# Patient Record
Sex: FEMALE | Race: WHITE
[De-identification: names, ages, dates, MRNs, and addresses within clinical notes are randomized per-mention and may not be internally consistent; named-entity substitution may affect disease eponyms.]

---

## 2017-12-31 ENCOUNTER — HOSPITAL ENCOUNTER (INPATIENT)
Dept: HOSPITAL 80 - FED | Age: 77
LOS: 4 days | Discharge: NURSING, REHAB OR LONG-TERM CARE | DRG: 956 | End: 2018-01-04
Attending: FAMILY MEDICINE | Admitting: FAMILY MEDICINE
Payer: COMMERCIAL

## 2017-12-31 DIAGNOSIS — I25.10: ICD-10-CM

## 2017-12-31 DIAGNOSIS — S72.002A: Primary | ICD-10-CM

## 2017-12-31 DIAGNOSIS — I25.2: ICD-10-CM

## 2017-12-31 DIAGNOSIS — G92: ICD-10-CM

## 2017-12-31 DIAGNOSIS — S43.012A: ICD-10-CM

## 2017-12-31 DIAGNOSIS — S32.512A: ICD-10-CM

## 2017-12-31 DIAGNOSIS — T40.605A: ICD-10-CM
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
## 2017-12-31 DIAGNOSIS — S32.10XA: ICD-10-CM

## 2017-12-31 DIAGNOSIS — M48.061: ICD-10-CM

## 2017-12-31 DIAGNOSIS — I10: ICD-10-CM

## 2017-12-31 DIAGNOSIS — F32.9: ICD-10-CM

## 2017-12-31 DIAGNOSIS — Z90.5: ICD-10-CM

## 2017-12-31 DIAGNOSIS — Z95.5: ICD-10-CM

## 2017-12-31 LAB
INR PPP: 0.86 (ref 0.83–1.16)
PLATELET # BLD: 275 10^3/UL (ref 150–400)
PROTHROMBIN TIME: 11.9 SEC (ref 12–15)

## 2017-12-31 PROCEDURE — 0RSKXZZ REPOSITION LEFT SHOULDER JOINT, EXTERNAL APPROACH: ICD-10-PCS | Performed by: PHYSICIAN ASSISTANT

## 2017-12-31 RX ADMIN — ACETAMINOPHEN PRN MG: 325 TABLET ORAL at 22:06

## 2017-12-31 RX ADMIN — OXYCODONE HYDROCHLORIDE PRN MG: 15 TABLET ORAL at 22:06

## 2017-12-31 RX ADMIN — HYDROMORPHONE HYDROCHLORIDE PRN MG: 2 INJECTION INTRAMUSCULAR; INTRAVENOUS; SUBCUTANEOUS at 22:05

## 2017-12-31 NOTE — EDPHY
H & P


Time Seen by Provider: 12/31/17 19:17


HPI/ROS: 





CHIEF COMPLAINT:  Fall, left shoulder and left hip injury





HISTORY OF PRESENT ILLNESS:  77-year-old female presents to the emergency 

department by ambulance after she had a mechanical fall at the movie theater.  

The patient states that she tripped and fell onto her left side.  She did not 

hit her head or lose consciousness.  She denies any presyncopal symptoms prior 

to her fall.  She complains of severe pain in her left shoulder as well as pain 

in her left hip.  She denies neck or back pain.  Denies abdominal pain.  Denies 

injuries to her right upper or lower extremities.





REVIEW OF SYSTEMS:


Constitutional:  No fever, no chills.


Eyes:  No double or blurry vision.


ENT:  No sore throat.


Respiratory:  No cough, no shortness of breath.


Cardiac:  No chest pain.


Gastrointestinal:  No abdominal pain, vomiting or diarrhea.


Genitourinary:  No dysuria.


Musculoskeletal:  No neck or back pain.


Skin:  No rashes.


Neurological:  No headache. (Katlyn Bernstein)


Past Medical/Surgical History: 





Hypertension, myocardial infarction with stent, kidney donor, back pain (Katlyn Bernstein)


Social History: 





Visiting from [place] (Katlyn Bernstein)


Physical Exam: 





General Appearance:  Alert, no distress. No physical signs of trauma to her 

head.  She is mentating normally and answering questions appropriately.


Eyes:  Pupils equal and round.  Extraocular motions are all intact.


ENT:  Mouth:  Mucous membranes moist.


Respiratory:  No wheezing, rhonchi, or rales, lungs are clear to auscultation.


Cardiovascular:  Regular rate and rhythm.


Gastrointestinal:  Abdomen is soft and nontender, no masses, no rebound or 

guarding, bowel sounds normal.


Neurological:  Alert and oriented x 3, cranial nerves II through XII grossly 

intact


Skin:  Warm and dry, no rashes.


Musculoskeletal:  Nontender to palpate along the cervical, thoracic or lumbar 

spine.  Neck is supple.


Extremities:  Patient is holding her left shoulder in external rotation.  

Obvious palpable deformity noted.  Nontender to palpate in her left wrist or 

elbow.  Pain with palpation to the lateral aspect of her left hip.  Pain with 

external rotation of the left hip.  Nontender to palpate the left knee or left 

ankle.  Nontender to palpate in her right upper or lower extremity.


Psychiatric:  Patient is oriented X 3, there is no agitation. (Katlyn Bernstein)


Constitutional: 


 Initial Vital Signs











Temperature (C)  36.3 C   12/31/17 19:13


 


Heart Rate  67   12/31/17 19:13


 


Respiratory Rate  18   12/31/17 19:13


 


Blood Pressure  209/105 H  12/31/17 19:13


 


O2 Sat (%)  92   12/31/17 19:13








 











O2 Delivery Mode [Post         Nasal Cannula





Procedure 3rd]                 


 


O2 Delivery Mode [Post         Non-Rebreather Mask





Procedure 2nd]                 


 


O2 Delivery Mode [Post         Non-Rebreather Mask





Procedure 1st]                 


 


O2 Delivery Mode [Procedural   Non-Rebreather Mask





1st]                           


 


O2 Delivery Mode               Non-Rebreather Mask


 


O2 (L/minute) [Post Procedure  5





3rd]                           


 


O2 (L/minute) [Post Procedure  10





2nd]                           


 


O2 (L/minute)                  15














Allergies/Adverse Reactions: 


 





NSAIDS (Non-Steroidal Anti-Inflamma Allergy (Verified 12/31/17 19:11)


 








Home Medications: 














 Medication  Instructions  Recorded


 


Beclomethasone Dipropionate [Qnasl] 2 sprays EACHNARE DAILY PRN 12/31/17


 


Clotrimazole 1% [Lotrimin 1%] 1 roberto carlos TP DAILY PRN 12/31/17


 


Co-Codamol 8mg/500mg 1 - 2 tab PO QID PRN 12/31/17


 


FLUoxetine [Prozac 20 MG (*)] 20 mg PO DAILY 12/31/17


 


Ramipril [Altace 1.25mg (*)] 1.25 mg PO HS 12/31/17














Medical Decision Making





- Diagnostics


Imaging: Discussed imaging studies w/ On call Radiologist, I viewed and 

interpreted images myself


Procedures: 


Procedure:  Dislocation reduction.





The dislocation of the the left shoulder was reduced using a gentle traction 

and counter traction technique without complications.  Post reduction the 

patient's neurovascular exam is normal.  Post reduction x-ray demonstrates 

reduction of the joint to the anatomic position.


The procedure was performed by myself.





The patient was placed in a sling and examined post application in good 

placement with normal CNS. (Katlyn Bernstein)


ED Course/Re-evaluation: 


77-year-old female presents after mechanical fall injuring her left shoulder 

and left hip.  X-rays reveal left femoral neck fracture as well as possible 

sacral insufficiency fracture.  There is subacute fracture to the left inferior 

pubic rami as well.





X-rays of the left shoulder reveal anterior inferior dislocation.





Procedural sedation performed by Dr. Jill Celeste, see procedure note.





Patient was placed in a sling and examined post application in good placement 

with normal CNS.  Patient will be admitted to Dr. Som Winslow, hospitalist.





X-rays of the left hip reveal femoral neck fracture.  The case was discussed 

with the on-call orthopedic surgeon, Dr. Juares, who will come to evaluate the 

patient.  The patient will be admitted to Dr. Som Winslow. (Katlyn Bernstein)





I have evaluated and participated in the management of this patient.  My co-

signature indicates that I have reviewed this chart and that I agree with the 

findings and the plan of care as documented.  My personal history and physical 

findings include: The patient is a 78 y/o female who had a mechanical fall in a 

movie theater tonight. Denies striking her head or loss of consciousness. Lungs 

clear. Heart regular.  Abdomen is obese, soft and nontender. Left leg shortened 

and externally rotated. Left arm is held up above her head and flexed at the 

elbow.  Deltoid sensation is intact bilaterally in the upper extremities.  2+ 

radial pulse on the left.  She is awake and alert.  GCS 15.  Head is 

normocephalic.  No tenderness with palpation of the neck and in the midline. No 

pain with active range of motion of her neck.  JAXON.  EOMI.  Tongue midline.  

Facial expressions symmetric.





2000: Procedure: Conscious sedation.





Indication: Left shoulder reduction


I was asked by MATTHIAS Bauer to perform procedural sedation.  The patient 

is an appropriate candidate to tolerate procedural sedation.  The patient's 

vitals signs and mental status are appropriate.  The risks, benefits and 

alternatives of the sedation were discussed with the patient.   The patient is 

ASA classification 3.  The patient's Mallampati airway score was 4 and the 

patient did not meet the 3-3-2 airway measurements.  A time out was completed.  

The patient was sedated with 85mg IV Propofol. The patient was monitored with 

continuous pulse oximetry, EKG monitor and end tidal CO2.  There were no 

complications. Patient had mild episode of apnea.  I performed the sedation.  

The total time I spent at the bedside during the procedural sedation was 15 

minutes.  The patient was examined after the procedural sedation and has 

returned to their pre-sedation baseline with normal vital signs and a normal 

examination.


 (Jill Celeste)


Differential Diagnosis: 





Including but limited to fracture, dislocation, contusion, sprain (Katlyn Bernstein)





- Data Points


Laboratory Results: 


 Laboratory Results





 12/31/17 19:10 





 12/31/17 19:10 








Medications Given: 


 





Acetaminophen (Tylenol)  650 mg PO Q6 PRN


   PRN Reason: Pain, Mild/Fever, Can Take PO


   Stop: 06/29/18 21:20


   Last Admin: 01/01/18 04:00 Dose:  650 mg


Enoxaparin Sodium (Lovenox)  40 mg SC DAILY [facility]


   Stop: 06/30/18 08:59


   Last Admin: 01/01/18 07:26 Dose:  Not Given


Fluoxetine HCl (Prozac)  20 mg PO DAILY [facility]


   Stop: 06/30/18 08:59


   Last Admin: 01/01/18 07:28 Dose:  20 mg


Hydromorphone HCl (Dilaudid)  0.25 - 0.5 mg IVP Q4 PRN


   PRN Reason: Pain, Severe Unable to Take PO


   Stop: 01/10/18 21:20


   Last Admin: 12/31/17 22:05 Dose:  0.5 mg


Oxycodone HCl (Oxycodone Ir)  5 - 10 mg PO Q4 PRN


   PRN Reason: Pain, Severe Able to Take PO


   Stop: 01/10/18 21:20


   Last Admin: 01/01/18 07:28 Dose:  10 mg





Discontinued Medications





Bacitracin (Bacitracin Syringe) Confirm Administered Dose 50,000 units IRR .STK-

MED ONE


   Stop: 01/01/18 11:11


   Last Admin: 01/01/18 11:22 Dose:  50,000 units


Bupivacaine HCl (Sensorcaine 0.5% Vial) Confirm Administered Dose 30 ml .ROUTE 

.STK-MED ONE


   Stop: 01/01/18 08:51


   Last Admin: 01/01/18 11:22 Dose:  30 ml


Diazepam (Valium Injection)  2.5 mg IVP EDNOW ONE


   Stop: 12/31/17 20:21


   Last Admin: 12/31/17 20:21 Dose:  2.5 mg


Epinephrine HCl (Epinephrine) Confirm Administered Dose 1 mg .ROUTE .STK-MED ONE


   Stop: 01/01/18 08:51


   Last Admin: 01/01/18 11:23 Dose:  1 mg


Fentanyl (Sublimaze)  100 mcg IVP EDNOW ONE


   Stop: 12/31/17 19:39


   Last Admin: 12/31/17 19:40 Dose:  100 mcg


Fentanyl (Sublimaze)  50 mcg IVP EDNOW ONE


   Stop: 12/31/17 21:02


   Last Admin: 12/31/17 21:05 Dose:  50 mcg


Sodium Chloride (Ns)  1,000 mls @ 0 mls/hr IV EDNOW ONE; Wide Open


   PRN Reason: Protocol


   Stop: 12/31/17 19:47


   Last Admin: 12/31/17 19:52 Dose:  1,000 mls


Cefazolin Sodium (Cefazolin Syringe)  2 gm in 20 mls @ 200 mls/hr IVP ONCALL ONE


   PRN Reason: Protocol


   Stop: 01/01/18 09:35


   Last Admin: 01/01/18 10:33 Dose:  20 mls


Propofol (Diprivan)  100 mg IVP EDNOW ONE


   Stop: 12/31/17 19:57


   Last Admin: 12/31/17 20:01 Dose:  85 mg








Departure





- Departure


Disposition: [facility]


Clinical Impression: 


Closed left hip fracture


Qualifiers:


 Encounter type: initial encounter Qualified Code(s): S72.002A - Fracture of 

unspecified part of neck of left femur, initial encounter for closed fracture





Dislocation of left shoulder joint


Qualifiers:


 Encounter type: initial encounter Qualified Code(s): S43.005A - Unspecified 

dislocation of left shoulder joint, initial encounter





Condition: Fair

## 2017-12-31 NOTE — PDGENHP
History and Physical





- Chief Complaint


Shoulder and hip pain





- History of Present Illness


This 77-year-old female visiting from [place] with history of coronary artery 

disease with stents as well as a solitary kidney who was at the movies at this 

evening and took a mechanical fall.  Patient had a good the bathroom.  She 

stood up and tripped upon exiting her I will.  She landed on her left side.  

She had immediate pain in her left shoulder and hip and was unable to get up.  

The there is no head trauma or loss of consciousness.  Prior to the fall today 

she has been in her usual state of health.





History Information





- Allergies/Home Medication List


Allergies/Adverse Reactions: 








NSAIDS (Non-Steroidal Anti-Inflamma Allergy (Verified 12/31/17 19:11)


 





Home Medications: 








Beclomethasone Dipropionate [Qnasl] 2 sprays EACHNARE DAILY PRN 12/31/17 [Last 

Taken Unknown]


Clotrimazole 1% [Lotrimin 1%] 1 roberto carlos TP DAILY PRN 12/31/17 [Last Taken 12/31/17]


Co-Codamol 8mg/500mg 1 - 2 tab PO QID PRN 12/31/17 [Last Taken 12/31/17 10:00 1 

tab]


FLUoxetine [Prozac 20 MG (*)] 20 mg PO DAILY 12/31/17 [Last Taken 12/30/17]


Ramipril [Altace 1.25mg (*)] 1.25 mg PO HS 12/31/17 [Last Taken 12/30/17]





I have personally reviewed and updated: family history, medical history, social 

history, surgical history





- Past Medical History


coronary artery disease (2 stents placed in 2007)


Additional medical history: Solitary kidney status post renal donation, 

hypertension, osteoarthritis, lumbar stenosis





- Surgical History


Additional surgical history: Kidney donation, stent placement





- Social History


Smoking Status: Never smoked


Alcohol Use: None


Drug Use: None


Additional social history: She is visiting from [place] in is scheduled to 

return to the [place] on January 16th





Review of Systems


Review of Systems: 





ROS: 10pt was reviewed & negative except for what was stated in HPI & below





Physical Exam


Physical Exam: 

















Temp Pulse Resp BP Pulse Ox


 


 36.3 C   65   8 L  220/107 H  91 L


 


 12/31/17 19:13  12/31/17 19:58  12/31/17 19:58  12/31/17 19:58  12/31/17 19:58











Constitutional: uncomfortable


Eyes: PERRL, anicteric sclera, EOMI


Ears, Nose, Mouth, Throat: moist mucous membranes, hearing normal, ears appear 

normal, no oral mucosal ulcers


Cardiovascular: regular rate and rhythym, no murmur, rub, or gallop, No edema


Peripheral Pulses: 2+: dorsalis-pedis (R), dorsalis-pedis (L)


Respiratory: no respiratory distress, no rales or rhonchi, clear to auscultation


Gastrointestinal: normoactive bowel sounds, soft, non-tender abdomen, no 

palpable masses


Genitourinary: no bladder fullness, no bladder tenderness


Skin: warm, normal color, no rashes or abrasions, no fluctuance, no induration, 

No mottled


Musculoskeletal: normal joint ROM, no joint effusions, other (Left lower 

extremity is held in external rotation and is painful with any kind of movement)


Neurologic: AAOx3, CN II-XII Intact, No facial droop


Psychiatric: interacting appropriately, not anxious, not encephalopathic, 

thought process linear


Lymph, Heme, Immunologic: no cervical LAD, no supraclavicular LAD





Lab Data & Imaging Review





 12/31/17 19:10





 12/31/17 19:10














WBC  10.46 10^3/uL (3.80-9.50)  H  12/31/17  19:10    


 


RBC  4.53 10^6/uL (4.18-5.33)   12/31/17  19:10    


 


Hgb  14.5 g/dL (12.6-16.3)   12/31/17  19:10    


 


Hct  41.5 % (38.0-47.0)   12/31/17  19:10    


 


MCV  91.6 fL (81.5-99.8)   12/31/17  19:10    


 


MCH  32.0 pg (27.9-34.1)   12/31/17  19:10    


 


MCHC  34.9 g/dL (32.4-36.7)   12/31/17  19:10    


 


RDW  12.3 % (11.5-15.2)   12/31/17  19:10    


 


Plt Count  275 10^3/uL (150-400)   12/31/17  19:10    


 


MPV  10.0 fL (8.7-11.7)   12/31/17  19:10    


 


Neut % (Auto)  45.3 % (39.3-74.2)   12/31/17  19:10    


 


Lymph % (Auto)  42.1 % (15.0-45.0)   12/31/17  19:10    


 


Mono % (Auto)  7.2 % (4.5-13.0)   12/31/17  19:10    


 


Eos % (Auto)  3.9 % (0.6-7.6)   12/31/17  19:10    


 


Baso % (Auto)  0.9 % (0.3-1.7)   12/31/17  19:10    


 


Nucleat RBC Rel Count  0.0 % (0.0-0.2)   12/31/17  19:10    


 


Absolute Neuts (auto)  4.75 10^3/uL (1.70-6.50)   12/31/17  19:10    


 


Absolute Lymphs (auto)  4.40 10^3/uL (1.00-3.00)  H  12/31/17  19:10    


 


Absolute Monos (auto)  0.75 10^3/uL (0.30-0.80)   12/31/17  19:10    


 


Absolute Eos (auto)  0.41 10^3/uL (0.03-0.40)  H  12/31/17  19:10    


 


Absolute Basos (auto)  0.09 10^3/uL (0.02-0.10)   12/31/17  19:10    


 


Absolute Nucleated RBC  0.00 10^3/uL (0-0.01)   12/31/17  19:10    


 


Immature Gran %  0.6 % (0.0-1.1)   12/31/17  19:10    


 


Immature Gran #  0.06 10^3/uL (0.00-0.10)   12/31/17  19:10    


 


Sodium  139 mEq/L (134-144)   12/31/17  19:10    


 


Potassium  3.5 mEq/L (3.5-5.2)   12/31/17  19:10    


 


Chloride  100 mEq/L ()   12/31/17  19:10    


 


Carbon Dioxide  24 mEq/l (22-31)   12/31/17  19:10    


 


Anion Gap  15 mEq/L (8-16)   12/31/17  19:10    


 


BUN  13 mg/dL (7-23)   12/31/17  19:10    


 


Creatinine  0.8 mg/dL (0.6-1.0)   12/31/17  19:10    


 


Estimated GFR  > 60   12/31/17  19:10    


 


Glucose  126 mg/dL ()  H  12/31/17  19:10    


 


Calcium  9.7 mg/dL (8.5-10.4)   12/31/17  19:10    








Visualized and Interpreted imaging results: Yes


Interpretation: Left femoral neck fracture with diffuse bone demineralization 

and sacral insufficiency fractures; shoulder x-ray revealed left anterior 

shoulder dislocation status post reduction in the emergency department





Assessment & Plan


Assessment: 





This 77-year-old female visiting from [place] presenting status post mechanical 

fall sustaining:





# left femoral neck fracture likely pathologic in the setting of osteoporosis


# left shoulder anterior dislocation status post reduction in the emergency 

department


# history coronary artery disease with stent placement head appears to be stable


# history of solitary kidney status post organ donation to her 





Plan:


-admit inpatient status


-Dr. Juares from [facility] has been consulted.  I have ordered preop EKG, liver 

function tests, and coagulation studies.  She should be clear for the operating 

room as long as her workup is unremarkable.





Will order Lovenox for DVT prophylaxis

## 2017-12-31 NOTE — GCON
[f rep st]



                                                                    CONSULTATION





DATE OF CONSULTATION:  12/31/2017



REFERRING PHYSICIAN:  Som Winslow DO



CHIEF COMPLAINT:  Left shoulder pain and left hip pain.



HISTORY OF PRESENT ILLNESS:  The patient is a 77-year-old female who is visiting from [place].  Earli
er today, she was watching a movie.  When she went to the bathroom, she tripped over the carpet and f
ell onto her left side.  She complained of left shoulder and left hip pain after the incident.  She w
as then taken to the [facility] ER.  She was evaluated by the ER staff.  X-ra
ys were taken, which showed an anterior shoulder dislocation.  The shoulder dislocation was reduced b
y the ER staff.  Postreduction radiograph showed a concentric reduction.  AP pelvis x-ray showed a di
splaced left femoral neck fracture with possible pubic rami fractures.  She was then admitted by the 
hospitalist, and I was consulted for further recommendations.



PAST MEDICAL HISTORY:  Significant for hypertension, coronary artery disease status post stenting 10 
years ago.  In addition, she has right hip osteoarthritis and lumbar spinal stenosis, for which she r
eceives injections.  She has currently 1 kidney after a donation.



SURGICAL HISTORY:  Elective kidney donation as well as stent placement.



SOCIAL HISTORY:  She does not smoke.  She does not drink or use illegal drugs.



REVIEW OF SYSTEMS:  Negative except for what was stated in the HPI.



OBJECTIVE:  VITAL SIGNS:  Significant for elevated blood pressure.  Please see the Motally system fo
r full vital signs.



EXAM:  GENERAL:  Alert and oriented x3, lying in bed.  CARDIOVASCULAR:  2+ DP pulse, 2+ radial pulse.
  PULMONARY:  Chest rise equal and unlabored bilaterally.  MUSCULOSKELETAL:  Left shoulder: Extensive
 palpation about the shoulder.  She is in the sling.  She is intact to light touch over the distribut
ion of the median, ulnar, radial, and axillary nerves.  She has intact motor function in her hand of 
the median, AIN, PIN, and ulnar nerves.  Motor function of the shoulder is unable to be tested due to
 pain.  Left hip:  She was resting with her leg in external rotation.  There is pain with movement of
 the hip.  She has intact EHL, FHL, tibialis anterior, and gastrocsoleus.  She has intact sensation t
o her foot.  Has a 2+ DP pulse.



LABS:  Her hemoglobin is 14.5, her white count is slightly elevated at 10.46.  Chem panel was normal 
and unremarkable.



IMAGING:  Shoulder x-rays as stated above in HPI.  AP pelvis:  There is a Garden IV displaced femoral
 neck fracture.  She has possible old pubic rami fractures.  The age is difficult to determine.  She 
has a possible sacral insufficiency fracture with some buckling of the sacrum.  However, this is diff
icult to assess on these images.



ASSESSMENT/PLAN:  

1.  Left displaced femoral neck fracture.  For this injury, hip hemiarthroplasty is indicated.  I dis
cussed with her the risks and benefits of hemiarthroplasty at length.  She understood and wished to p
roceed.  The patient denied any prior problems with hip pain on that side in the past.  She has had s
everal falls in the past, however, denied any hip pain to the left side.  She is a community ambulato
r with a cane, mostly due to her right hip osteoarthritis and the lumbar spinal stenosis.  Will plan 
on hemiarthroplasty tomorrow, if the patient is cleared for surgery by the Hospitalist team.  I discu
ssed the plan at length with the patient and the patient's daughter today.  

2.  Left anterior shoulder dislocation.  X-ray showed a maintained reduction.  She will be treated in
 a sling.  Will address the shoulder with physical therapy as an inpatient as well.





Job #:  [id number]

## 2018-01-01 LAB — PLATELET # BLD: 207 10^3/UL (ref 150–400)

## 2018-01-01 PROCEDURE — 0SRB0JZ REPLACEMENT OF LEFT HIP JOINT WITH SYNTHETIC SUBSTITUTE, OPEN APPROACH: ICD-10-PCS | Performed by: ORTHOPAEDIC SURGERY

## 2018-01-01 RX ADMIN — ENOXAPARIN SODIUM SCH: 100 INJECTION SUBCUTANEOUS at 07:26

## 2018-01-01 RX ADMIN — OXYCODONE HYDROCHLORIDE PRN MG: 15 TABLET ORAL at 23:31

## 2018-01-01 RX ADMIN — ACETAMINOPHEN SCH MG: 325 TABLET ORAL at 23:32

## 2018-01-01 RX ADMIN — OXYCODONE HYDROCHLORIDE PRN MG: 15 TABLET ORAL at 04:00

## 2018-01-01 RX ADMIN — SODIUM CHLORIDE, SODIUM LACTATE, POTASSIUM CHLORIDE, AND CALCIUM CHLORIDE SCH MLS: 600; 310; 30; 20 INJECTION, SOLUTION INTRAVENOUS at 16:27

## 2018-01-01 RX ADMIN — Medication SCH MLS: at 16:29

## 2018-01-01 RX ADMIN — OXYCODONE HYDROCHLORIDE PRN MG: 15 TABLET ORAL at 17:52

## 2018-01-01 RX ADMIN — OXYCODONE HYDROCHLORIDE PRN MG: 15 TABLET ORAL at 07:28

## 2018-01-01 RX ADMIN — Medication SCH MLS: at 23:32

## 2018-01-01 RX ADMIN — ACETAMINOPHEN SCH MG: 325 TABLET ORAL at 17:51

## 2018-01-01 RX ADMIN — RAMIPRIL SCH MG: 1.25 CAPSULE ORAL at 20:52

## 2018-01-01 RX ADMIN — FAMOTIDINE SCH MG: 20 TABLET, FILM COATED ORAL at 20:52

## 2018-01-01 RX ADMIN — DOCUSATE SODIUM AND SENNOSIDES SCH TAB: 50; 8.6 TABLET ORAL at 20:52

## 2018-01-01 RX ADMIN — ACETAMINOPHEN PRN MG: 325 TABLET ORAL at 04:00

## 2018-01-01 RX ADMIN — ASPIRIN SCH MG: 325 TABLET, DELAYED RELEASE ORAL at 20:52

## 2018-01-01 NOTE — CPEKG
Heart Rate: 79

RR Interval: 759

P-R Interval: 176

QRSD Interval: 122

QT Interval: 396

QTC Interval: 455

P Axis: 21

QRS Axis: -48

T Wave Axis: 128

EKG Severity - ABNORMAL ECG -

EKG Impression: SINUS RHYTHM

EKG Impression: LEFT BUNDLE BRANCH BLOCK

Electronically Signed By: Jefe Welsh 02-Jan-2018 12:39:52

## 2018-01-01 NOTE — SOAPPROG
SOAP Progress Note


Assessment/Plan: 


77-year-old female visiting from [place], status post mechanical fall 





# left femoral neck fracture 


   -surgical repair today with Dr Juares, discussed care plan with him


   -ambulate when OK with Dr Juares (uses a walker normally)


   -consider discharge to inpt rehab





# left shoulder anterior dislocation status post reduction in the emergency 

department


   -PT/OT eval


   -likely rehab at discharge





# history coronary artery disease with stent placement 


   -continue home meds





# history of solitary kidney status post organ donation to her 


   -follow creat, avoid renal toxins





PCP- MEKHI, from [place], here visiting family


DVT prophylaxis- per ortho 


FULL CODE





DISPO- > 2 mdnts anticipated, then prob rehab





Subjective: 





Says pain OK, no n/v/CP/SOB.  Able to move L arm/shoulder. Surgery planned this 

AM for hip. Daughter in room, discussed possible dispo options.


Objective: 





 Vital Signs











Temp Pulse Resp BP Pulse Ox


 


 98.1 F   85   16   144/72 H  93 


 


 01/01/18 08:03  01/01/18 10:22  01/01/18 10:22  01/01/18 10:22  01/01/18 10:22








 











 12/31/17 01/01/18 01/02/18





 11:59 11:59 11:59


 


Intake Total  1200 


 


Output Total  1775 


 


Balance  -575 








 











PT  11.9 SEC (12.0-15.0)  L  12/31/17  19:10    


 


INR  0.86  (0.83-1.16)   12/31/17  19:10    














- Time Spent With Patient


Time Spent With Patient: 





30 mins





- Pending Discharge


Pending Discharge Within 24 Hours: No





Physical Exam





- Physical Exam


General Appearance: WD/WN, alert, no apparent distress


EENT: normal ENT inspection


Respiratory: lungs clear, normal breath sounds, No respiratory distress


Cardiac/Chest: regular rate, rhythm, No edema, No JVD, No systolic murmur


Abdomen: normal bowel sounds, non-tender, soft, other (gordon in place)


Skin: warm/dry


Neuro/Psych: alert, normal mood/affect, No cognition abnormalities





ICD10 Worksheet


Patient Problems: 


 Problems











Problem Status Onset


 


Closed left hip fracture Acute  


 


Dislocation of left shoulder joint Acute  


 


Hip fracture Acute

## 2018-01-01 NOTE — POSTANESTH
Post Anesthetic Evaluation


Cardiovascular Status: Similar to Pre-Op Cond


Respiratory Status: Normal, Stable, Similar to Pre-op Cond.


Level of Consciousness/Mental Status: Can Participate in Eval, Mildly Sleepy, 

Arousable


Pain Control: Adequate, Prn Tx Ordered


Nausea/Vomiting Control: Adequate, Prn Tx Ordered


Complications Possibly Related to Anesthesia: None Noted

## 2018-01-01 NOTE — PDANEPAE
ANE History of Present Illness





L hip ORIF





ANE Past Medical History





- Cardiovascular History


Hx Hypertension: Yes


Hx Coronary Artery / Peripheral Vascular Disease: Yes





- Pulmonary History


Hx Oxygen in Use at Home: No


Hx Sleep Apnea: No





- Endocrine History


Hx Diabetes: No





- Renal History


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





ANE Review of Systems


Review of systems is: negative


Review of Systems: 








ANE Patient History





- Allergies


Allergies/Adverse Reactions: 








NSAIDS (Non-Steroidal Anti-Inflamma Allergy (Verified 12/31/17 19:11)


 








- Home Medications


Home medications: home medication list seen and reviewed


Home Medications: 








Beclomethasone Dipropionate [Qnasl] 2 sprays EACHNARE DAILY PRN 12/31/17 [Last 

Taken Unknown]


Clotrimazole 1% [Lotrimin 1%] 1 roberto carlos TP DAILY PRN 12/31/17 [Last Taken 12/31/17]


Co-Codamol 8mg/500mg 1 - 2 tab PO QID PRN 12/31/17 [Last Taken 12/31/17 10:00 1 

tab]


FLUoxetine [Prozac 20 MG (*)] 20 mg PO DAILY 12/31/17 [Last Taken 12/30/17]


Ramipril [Altace 1.25mg (*)] 1.25 mg PO HS 12/31/17 [Last Taken 12/30/17]








- NPO status


NPO Status: no food or drink >8 hours





- Anes Hx


Anes Hx: no prior problems





- Smoking Hx


Smoking Status: Never smoked





- Alcohol Use


Alcohol Use: None





- Family Anes Hx


Family Anes Hx: none





ANE Labs/Vital Signs





- Labs


Result Diagrams: 


 12/31/17 19:10





 12/31/17 19:10





- Vital Signs


Blood Pressure: 144/72


Heart Rate: 85


Respiratory Rate: 16


O2 Sat (%): 93


Height: 157.48 cm


Weight: 70.307 kg





ANE Physical Exam





- Airway


Neck exam: FROM


Mallampati Score: Class 3


Mouth exam: dentures





- Pulmonary


Pulmonary: no respiratory distress





- Cardiovascular


Cardiovascular: regular rate and rhythym





- ASA Status


ASA Status: III





ANE Anesthesia Plan


Anesthesia Plan: general endotracheal anesthesia

## 2018-01-02 RX ADMIN — DOCUSATE SODIUM AND SENNOSIDES SCH TAB: 50; 8.6 TABLET ORAL at 10:03

## 2018-01-02 RX ADMIN — POLYETHYLENE GLYCOL 3350 SCH GM: 17 POWDER, FOR SOLUTION ORAL at 10:02

## 2018-01-02 RX ADMIN — OXYCODONE HYDROCHLORIDE PRN MG: 15 TABLET ORAL at 16:53

## 2018-01-02 RX ADMIN — ACETAMINOPHEN SCH MG: 325 TABLET ORAL at 18:21

## 2018-01-02 RX ADMIN — FAMOTIDINE SCH MG: 20 TABLET, FILM COATED ORAL at 22:29

## 2018-01-02 RX ADMIN — CYCLOBENZAPRINE HYDROCHLORIDE PRN MG: 10 TABLET, FILM COATED ORAL at 14:06

## 2018-01-02 RX ADMIN — ASPIRIN SCH MG: 325 TABLET, DELAYED RELEASE ORAL at 10:03

## 2018-01-02 RX ADMIN — DOCUSATE SODIUM AND SENNOSIDES SCH TAB: 50; 8.6 TABLET ORAL at 22:29

## 2018-01-02 RX ADMIN — OXYCODONE HYDROCHLORIDE PRN MG: 15 TABLET ORAL at 05:29

## 2018-01-02 RX ADMIN — OXYCODONE HYDROCHLORIDE PRN MG: 15 TABLET ORAL at 13:22

## 2018-01-02 RX ADMIN — ENOXAPARIN SODIUM SCH: 100 INJECTION SUBCUTANEOUS at 13:39

## 2018-01-02 RX ADMIN — ACETAMINOPHEN SCH MG: 325 TABLET ORAL at 05:29

## 2018-01-02 RX ADMIN — ACETAMINOPHEN SCH MG: 325 TABLET ORAL at 22:29

## 2018-01-02 RX ADMIN — ACETAMINOPHEN SCH MG: 325 TABLET ORAL at 12:07

## 2018-01-02 RX ADMIN — SODIUM CHLORIDE, SODIUM LACTATE, POTASSIUM CHLORIDE, AND CALCIUM CHLORIDE SCH MLS: 600; 310; 30; 20 INJECTION, SOLUTION INTRAVENOUS at 12:15

## 2018-01-02 RX ADMIN — HYDROMORPHONE HYDROCHLORIDE PRN MG: 2 INJECTION INTRAMUSCULAR; INTRAVENOUS; SUBCUTANEOUS at 14:06

## 2018-01-02 RX ADMIN — CYCLOBENZAPRINE HYDROCHLORIDE PRN MG: 10 TABLET, FILM COATED ORAL at 12:07

## 2018-01-02 RX ADMIN — RAMIPRIL SCH MG: 1.25 CAPSULE ORAL at 22:30

## 2018-01-02 RX ADMIN — CYCLOBENZAPRINE HYDROCHLORIDE PRN MG: 10 TABLET, FILM COATED ORAL at 22:30

## 2018-01-02 RX ADMIN — FAMOTIDINE SCH MG: 20 TABLET, FILM COATED ORAL at 10:03

## 2018-01-02 RX ADMIN — OXYCODONE HYDROCHLORIDE PRN MG: 15 TABLET ORAL at 12:11

## 2018-01-02 RX ADMIN — OXYCODONE HYDROCHLORIDE PRN MG: 15 TABLET ORAL at 08:41

## 2018-01-02 RX ADMIN — CYCLOBENZAPRINE HYDROCHLORIDE PRN MG: 10 TABLET, FILM COATED ORAL at 01:10

## 2018-01-02 NOTE — ASMTCMCOM
CM Note

 

CM Note                       

Notes:

Pt had surgery for hip fracture after fall, she also has L shoulder dislocation. Pt is visiting 

from [place], visiting dghtr Radha who lives in [place]. PT/OT rec SNF. Pt does have travel 

insurance, unknown at this time if it will cover a SNF benefit. Family is interested in [facility] 

[place], faxed a referral and they declined because they do not take pt travel insurance. Radha 

and her brother will try to find out about SNF benefit. Tomorrow this CM can call the insurances 

number on face sheet. Radha reports a doctor mentioned pt is not able to fly for another month 

otherwise, family would like to explore SNF in [place]. Pt was originally supposed to return to 

[place] 01/16/18 where she lives with another dghtr. This CM explained to Radha most travel 

insurances do not cover any after care of a hospitalization, mentioned private pay which Radha 

will think about and have to talk to family if insurance will not cover SNF. 

D/c plan of care: Pt needs SNF, a challenge is what travel insurance will cover so until known 

TBD. 

 

Date Signed:  01/02/2018 05:04 PM

Electronically Signed By:BRENTON Chao

## 2018-01-02 NOTE — SOAPPROG
SOAP Progress Note


Assessment/Plan: 


77-year-old female visiting from [place], status post mechanical fall 





# left femoral neck fracture 


   -pod #1, Dr Juares


   -ambulation and DVT prophy per ortho recs


   -hopeul discharge to inpt rehab/SNF but CM working on it bc of insurance





# left shoulder anterior dislocation status post reduction in the emergency 

department


   -PT/OT 





# history coronary artery disease with stent placement 


   -continue home meds





# history of solitary kidney status post organ donation to her 


   -follow creat, avoid renal toxins





# anemia from acute blood loss at surgery


   -follow serial H/H





PCP- OOT, from [place], here visiting family


DVT prophylaxis- per ortho 325 mg ASA


FULL CODE





DISPO- > 2 mdnts anticipated, then prob rehab








Subjective: 





Friend in room with her. Having spasms, significant pain. Has moved with PT 

today a little. No CP/SOB. Not much intake/uop per nursing.


Objective: 





 Vital Signs











Temp Pulse Resp BP Pulse Ox


 


 97.7 F   82   16   117/63   92 


 


 01/02/18 15:29  01/02/18 15:29  01/02/18 15:29  01/02/18 15:29  01/02/18 15:29








 Laboratory Results





 01/02/18 04:20 





 01/02/18 04:20 





 











 01/01/18 01/02/18 01/03/18





 11:59 11:59 11:59


 


Intake Total 1200 2855 1510


 


Output Total 1775 950 


 


Balance -575 1905 1510








 











PT  11.9 SEC (12.0-15.0)  L  12/31/17  19:10    


 


INR  0.86  (0.83-1.16)   12/31/17  19:10    














- Pending Discharge


Pending Discharge Within 24 Hours: No





Physical Exam





- Physical Exam


General Appearance: WD/WN, alert, no apparent distress


Neck: supple


Respiratory: lungs clear, normal breath sounds, No respiratory distress


Cardiac/Chest: regular rate, rhythm, No edema


Abdomen: normal bowel sounds, non-tender, soft, No organomegaly


Skin: warm/dry


Neuro/Psych: alert, normal mood/affect, No cognition abnormalities, No speech 

abnormalities





ICD10 Worksheet


Patient Problems: 


 Problems











Problem Status Onset


 


Closed left hip fracture Acute  


 


Dislocation of left shoulder joint Acute  


 


Hip fracture Acute

## 2018-01-02 NOTE — PDMN
Medical Necessity


Medical necessity: Patient meets inpatient criteria per physician note and [facility]

-600 Hip: Displaced Fracture of Femoral Neck, Hemiarthroplasty - 3 days postop 

-  ( L femoral neck fracture and L shoulder displacement s/p mechanical fall.  

Anticipated LOS > 2 midnights and Medicare inpatient-only surgery)

## 2018-01-02 NOTE — GOP
[f rep st]



                                                                OPERATIVE REPORT





DATE OF OPERATION:  01/01/2018



SURGEON:  Lamont Juares MD



ASSISTANT:  

Brittney Johnson PA-C



PREOPERATIVE DIAGNOSIS:  

1.  Left displaced femoral neck fracture, Garden IV.

2.  Left pubic rami and nondisplaced sacral fractures (LC1 injury).

3.  Left closed shoulder dislocation.



POSTOPERATIVE DIAGNOSIS:  

1.  Left displaced femoral neck fracture, Garden IV.

2.  Left pubic rami and nondisplaced sacral fractures (LC1 injury).

3.  Left closed shoulder dislocation.



PROCEDURE PERFORMED:  

1.  Left hip hemiarthroplasty.

2.  Closed treatment of pelvic injury (pubic rami and sacral fractures, nondisplaced).



FINDINGS:  





ESTIMATED BLOOD LOSS:  400 cc.



INDICATIONS:  This is a 77-year-old female who came in to the ER on the night of December 31st. She i
s visiting from [place]. She was watching a movie with her family and when she went to the bathroom, 
she tripped over the rug, falling onto her left side. She complained of left shoulder and left hip pa
in after the fall. The patient was taken to the ER. On evaluation by the ER staff, which included x-r
ays, a left femoral neck fracture and a left closed shoulder dislocation were identified. The shoulde
r was reduced by the ER staff. I was called for evaluation of the hip fracture. As she has a displace
d femoral neck fracture, a hemiarthroplasty is indicated. Prior to her accident, the patient was a co
mmunity ambulator with a cane. She has right hip arthritis and lumbar stenosis, which limits her mobi
lity. I discussed with her and her family the risks and benefits of operative treatment to include pa
in, bleeding, infection, damage to surrounding structures, chondrolysis, progressive arthritis of the
 acetabulum, hip instability including dislocation, periprosthetic infection, femoral fracture,  ____
______, and need for further surgery. She understood the diagnosis and risks and wished to proceed. T
he patient was admitted to the Hospitalist Service and by the following day, she was cleared for surg
marcos by the Hospitalist Service and taken to surgery within the 24-hour period of her injury. 



Preoperatively, a CT scan was ordered to evaluate the pelvis as the x-ray showed possible pubic rami 
and sacral fractures. CT scan was evaluated by me and there appeared to be nondisplaced pubic rami fr
acture and nondisplaced sacral impaction type fracture, which is an LC1 type injury. Both the sacral 
and pubic rami fractures had abundant callus formation, which thus indicated this was an old injury f
rom a prior fall. The patient did mention that she has had prior falls in the past. She denied pain i
n the hip recently.



DESCRIPTION OF PROCEDURE:  Patient was seen in the preoperative holding area and given the opportunit
y to ask any questions. All of her questions were answered. After review of the consent, the consent 
was signed. The surgical site was marked. She was then taken to the operative suite. General anesthes
ia was induced by the Anesthesia Team.  Ancef 2 g IV was given prior to incision. A time-out was call
ed including Surgical and Anesthesia Teams confirming the surgical site and procedure to be performed
. After induction of general anesthesia, the patient was very carefully transferred from the [facility]
o the operating room table and then she was placed carefully in the lateral decubitus position with t
he left side up on the backboard. Great care was taken to ensure all bony prominences were padded on 
the backboard and an axillary roll was placed. She was prepped and draped in the usual sterile fashio
n. 



A standard incision for an anterolateral approach to the hip was made, very carefully dissecting down
 through the skin and the subcutaneous fat and carefully cauterizing all bleeders. The IT band and fa
scia over the TFL was identified and was split in the midline. This allowed for exposure of the great
er trochanter. The patient appeared to have had a prior injury to her adductors as the lateral portio
n of the greater trochanter was bald. There was a seroma in the pseudobursa and part of the gluteus m
edius had been torn off by prior falls. The anterolateral approach was begun with a 1 cm split of the
 vastus lateralis and extending to the peel of the gluteus medius, whatever was left of it in this ca
se, and then a split of the gluteus medius muscle. The leg was externally rotated. The peel of the an
terior third of the abductors was performed up to the femoral neck in the standard fashion. At the fe
moral neck, the gluteus minimus tendon was also peeled off the troch and the capsule split vertically
. The femoral head was visualized. At this point, the femoral neck cut was made with a saw. This allo
wed for better visualization. The head was visualized. A corkscrew was used to remove the head. Howev
er, the head was quite impacted and bone was quite soft so that the corkscrew would not hold. The hea
d was taken out in piecemeal fashion. Much of it was left intact to be able to measure. It was delive
red out with a combination of instruments. Care was taken to ensure that the acetabular cartilage was
 protected as the head was delivered out. It was then measured at the back table and measured between
 a 41 and a 42. Both a 41 and 42 trial were placed into the socket with good fit with both. A 43 was 
also trialed. This was slightly too big. 



At this point, after prepping the femur, the leg was dropped into the bag over the side of the  table
. The femoral neck cut was freshened up. A box chisel was used and the canal finder was used. A later
alizing reamer was used, and then sequentially broached from 1 to 5, taking care to lateralize each t
cara. At about 5 the broach would not sink into the femur anymore. Care was taken during broaching to 
not use undue force due to concern of a calcar fracture. She was then trialed on the broach. With a 0
 length head, the femur would not reduce. A minus 3 head was then placed with the 42 bipolar componen
t. The patient was paralyzed to assist with reduction and she reduced well. At this position she was 
very stable. However, the head seemed quite tight; it did not rotate in the socket. The decision was 
then made to use a smaller head component for the final component. 

At this point, I decided to place the final stem. The femur was pulse lavaged. The stem was carefully
 impacted and was placed flush with the calcar cut. Again, the trial on the stem there was a good red
uction with good stability with a 41 head -3. Leg length at this neck length appeared even. The high 
offset neck was used as the patient had quite a varus neck anatomically on her contralateral side. Th
e final components were then placed in the standard fashion according to the  over the st
em. A trial reduction was performed. On the trial reduction the hip had lost some of its stability. A
s the final components were already put on, the decision was made to remove the 41 bipolar component 
and place a 42. This improved the stability somewhat. The patient was fully stable in internal rotati
on posteriorly.  On external rotation anteriorly the head did not dislocate until about 60 degrees of
 external rotation. The lengths were checked. The leg lengths were equal. The C-arm unit was brought 
in to examine the components. The stem had great canal fill. She was slightly lengthened on that side
 and the decision was thus made to leave the components. 



The wound was copiously irrigated. The capsule was closed with a #5 FiberWire. The split in the glute
us medius was repaired with #5 FiberWire. The minimus tendon was repaired to the troch with a #5. The
 same was done with the vastus. The gluteus medius anterior third peel was repaired with #5 FiberWire
 through holes in the greater trochanter. The IT band was closed with #1 Vicryl. The #1 Vicryl was th
en used for Shemar's layer. A 2-0 Monocryl was used for the subcutaneous fat layer and a deep dermal 
running stitch was used. Staples were used for the skin. A sterile dressing was applied. 



The patient tolerated procedure well and was taken to the PACU in stable condition.



IMPLANTS USED:  [place] Accolade, stem size 5, size 26 -3 head, and size 42 bipolar head component.



POSTOP CONDITION:  Stable.



POSTOPERATIVE PLAN:  The patient will be  back to the Hospitalist Service for postoperative care. She
 is weightbearing as tolerated on the left side. On the CT scan that was done preoperatively of her jese san, the CT scan reflected nondisplaced pubic rami fractures and a nondisplaced sacral impaction ty
pe fracture, an LC1 type injury. These appear to be old injuries, however. There was callus formation
 at all the fracture sites, which indicated this was an old injury from a prior fall. The patient has
 been ambulating on this injury without issue prior to this current fall. Thus, the decision was made
 to allow her to fully weight bear on that leg. She was given anterior hip precautions. She is not to
 externally rotate beyond 45 degrees of external rotation of that leg. The patient will be nonweightb
earing of her left arm due to the shoulder dislocation. This will be treated in a conservative forrest
nNarcisa





Job #:  [id number]

## 2018-01-02 NOTE — SOAPPROG
SOAP Progress Note


Assessment/Plan: 


Assessment: POD #1 s/p L hip hemiarthroplasty w/ L shoulder dislocation s/p 

closed reduction in ED


























Plan:


-PT/OT


  -anterior hip precautions L hip


  -work on PROM L shoulder, pendulums


-WBAT LLE, NWB RUE


-DVT prophy with ASA


-pain ctrl


-appreciate care of hospitalist service


-per case mgmt notes pt transfer to acute rehab








01/02/18 09:51





Subjective: 





Doing ok this am per nursing staff. She sat up to a chair today. Still having 

quite a bit of pain in that hip. No acute events


Objective: 





 Vital Signs











Temp Pulse Resp BP Pulse Ox


 


 36.7 C   81   16   117/55 L  94 


 


 01/02/18 07:30  01/02/18 07:30  01/02/18 07:30  01/02/18 07:30  01/02/18 07:30








 Laboratory Results





 01/02/18 04:20 





 01/02/18 04:20 





 











 01/01/18 01/02/18 01/03/18





 05:59 05:59 05:59


 


Intake Total 1200 2855 


 


Output Total 1775 950 


 


Balance -575 1905 








 











PT  11.9 SEC (12.0-15.0)  L  12/31/17  19:10    


 


INR  0.86  (0.83-1.16)   12/31/17  19:10    








Gen: NAD, sitting up in chair


MSK:


   LLE


   -incision covered


  -5/5 TA/GS/EHL/FHL


  -SILT foot


  -foot well perfused


  -leg lengths equal sitting in chair, rotation equal





- Time Spent With Patient


Time Spent With Patient: 





10 min





ICD10 Worksheet


Patient Problems: 


 Problems











Problem Status Onset


 


Closed left hip fracture Acute  


 


Dislocation of left shoulder joint Acute  


 


Hip fracture Acute

## 2018-01-03 LAB — PLATELET # BLD: 177 10^3/UL (ref 150–400)

## 2018-01-03 RX ADMIN — ACETAMINOPHEN SCH MG: 325 TABLET ORAL at 18:40

## 2018-01-03 RX ADMIN — DOCUSATE SODIUM AND SENNOSIDES SCH TAB: 50; 8.6 TABLET ORAL at 08:42

## 2018-01-03 RX ADMIN — POLYETHYLENE GLYCOL 3350 SCH GM: 17 POWDER, FOR SOLUTION ORAL at 10:04

## 2018-01-03 RX ADMIN — ACETAMINOPHEN SCH MG: 325 TABLET ORAL at 23:32

## 2018-01-03 RX ADMIN — LIDOCAINE SCH EA: 50 PATCH TOPICAL at 16:24

## 2018-01-03 RX ADMIN — RAMIPRIL SCH MG: 1.25 CAPSULE ORAL at 22:27

## 2018-01-03 RX ADMIN — DOCUSATE SODIUM AND SENNOSIDES SCH TAB: 50; 8.6 TABLET ORAL at 22:27

## 2018-01-03 RX ADMIN — ACETAMINOPHEN SCH MG: 325 TABLET ORAL at 06:13

## 2018-01-03 RX ADMIN — ASPIRIN SCH MG: 325 TABLET, DELAYED RELEASE ORAL at 08:42

## 2018-01-03 RX ADMIN — OXYCODONE HYDROCHLORIDE PRN MG: 15 TABLET ORAL at 08:42

## 2018-01-03 RX ADMIN — FAMOTIDINE SCH MG: 20 TABLET, FILM COATED ORAL at 08:41

## 2018-01-03 RX ADMIN — OXYCODONE HYDROCHLORIDE PRN MG: 15 TABLET ORAL at 03:16

## 2018-01-03 RX ADMIN — ACETAMINOPHEN SCH MG: 325 TABLET ORAL at 12:31

## 2018-01-03 RX ADMIN — FAMOTIDINE SCH MG: 20 TABLET, FILM COATED ORAL at 22:28

## 2018-01-03 NOTE — SOAPPROG
SOAP Progress Note


Assessment/Plan: 


Assessment: POD #2 s/p L hip hemiarthroplasty w/ L shoulder dislocation s/p 

closed reduction in ED


























Plan:


-PT/OT


  -anterior hip precautions L hip


  -work on PROM L shoulder, pendulums


-WBAT LLE, NWB RUE


-DVT prophy with ASA


-pain ctrl


-appreciate care of hospitalist service


-per case mgmt notes pt may transfer to acute rehab vs snf, however they are 

working on the insurance situation [place]








01/02/18 09:51





01/03/18 07:41





Subjective: 





Cont to have pain in the hip and shoulder. Got up to a chair yesterday. Per 

night nursing staff, the pt was straight cath'd twice. No other issues otherwise


Objective: 





 Vital Signs











Temp Pulse Resp BP Pulse Ox


 


 37.1 C   113 H  18   189/87 H  96 


 


 01/03/18 07:20  01/03/18 07:20  01/03/18 07:20  01/03/18 07:20  01/03/18 07:20








 Laboratory Results





 01/03/18 04:13 





 01/03/18 04:13 





 











 01/02/18 01/03/18 01/04/18





 05:59 05:59 05:59


 


Intake Total 2855 1860 


 


Output Total 950 125 


 


Balance 1905 1735 








 











PT  11.9 SEC (12.0-15.0)  L  12/31/17  19:10    


 


INR  0.86  (0.83-1.16)   12/31/17  19:10    








Gen: NAD, lying in bed


Msk:


   LUE


  -in sling


  -moving all fingers





LLE


-dressing is c/d/i, no breakthrough


-intact TA/GS/EHL/FHL


-leg lengths equal, neutral rotation 





ICD10 Worksheet


Patient Problems: 


 Problems











Problem Status Onset


 


Closed left hip fracture Acute  


 


Dislocation of left shoulder joint Acute  


 


Hip fracture Acute

## 2018-01-03 NOTE — HOSPPROG
Hospitalist Progress Note


Assessment/Plan: 





#Left femoral neck fracture


-POD #1 hemiarthroplasty


-pain control: APAP, lidoderm patch. Stop opioids except for very low-dose 

morphine if uncontrolled pain





#Left pubic rami/sacral fractures: nondisplaced. WBAT





#Left shoulder dislocation: s/p reduction





#Acute encephalopathy/agitation: suspect due to pain meds, Restoril and Flexeril


-no focal deficits on exam


-check UA


-sitter to avoid falls





#Leukocytosis: resolved





#Depression: Prozac





#h/o CAD: cont home meds





#Diet: regular, IVFs





#disp: cont inpatient admission for pain control, PT





#


Subjective: c/o 10/10 pain in shoulder with movement. Very confused this 

morning. A little more clear on second visit today


Objective: 


 Vital Signs











Temp Pulse Resp BP Pulse Ox


 


 37.1 C   113 H  18   189/87 H  96 


 


 01/03/18 07:20  01/03/18 07:20  01/03/18 07:20  01/03/18 07:20  01/03/18 07:20








 Laboratory Results





 01/03/18 04:13 





 01/03/18 04:13 





 











 01/02/18 01/03/18 01/04/18





 05:59 05:59 05:59


 


Intake Total 2855 1860 


 


Output Total 950 125 


 


Balance 1905 1735 








 











PT  11.9 SEC (12.0-15.0)  L  12/31/17  19:10    


 


INR  0.86  (0.83-1.16)   12/31/17  19:10    














- Physical Exam


Constitutional: no apparent distress


Eyes: PERRL


Ears, Nose, Mouth, Throat: moist mucous membranes, hearing normal


Cardiovascular: regular rate and rhythym, no murmur, rub, or gallop


Respiratory: no respiratory distress, no rales or rhonchi


Gastrointestinal: normoactive bowel sounds, soft, non-tender abdomen


Genitourinary: no bladder fullness, No gordon in urethra


Skin: warm


Musculoskeletal: other (left arm slinged)


Neurologic: CN II-XII Intact, other (alert self only, but not babbling like 

earlier.), No facial droop


Psychiatric: interacting appropriately





ICD10 Worksheet


Patient Problems: 


 Problems











Problem Status Onset


 


Closed left hip fracture Acute  


 


Dislocation of left shoulder joint Acute  


 


Hip fracture Acute

## 2018-01-03 NOTE — ASMTCMCOM
CM Note

 

CM Note                       

Notes:

TC to [employer] ([phone]) spoke jimmy French who needs to review clinicals to determine if 


they can auth SNF. UR staff to send clincials. Gillian to call after review. CM to follow. 

 

Date Signed:  01/03/2018 09:48 AM

Electronically Signed By:BRENTON Chao

## 2018-01-04 VITALS
HEART RATE: 84 BPM | TEMPERATURE: 98.5 F | OXYGEN SATURATION: 96 % | SYSTOLIC BLOOD PRESSURE: 139 MMHG | DIASTOLIC BLOOD PRESSURE: 58 MMHG

## 2018-01-04 VITALS — RESPIRATION RATE: 16 BRPM

## 2018-01-04 RX ADMIN — LIDOCAINE SCH EA: 50 PATCH TOPICAL at 08:36

## 2018-01-04 RX ADMIN — ACETAMINOPHEN SCH MG: 325 TABLET ORAL at 14:09

## 2018-01-04 RX ADMIN — ASPIRIN SCH MG: 325 TABLET, DELAYED RELEASE ORAL at 08:35

## 2018-01-04 RX ADMIN — ACETAMINOPHEN SCH MG: 325 TABLET ORAL at 05:25

## 2018-01-04 RX ADMIN — FAMOTIDINE SCH MG: 20 TABLET, FILM COATED ORAL at 08:35

## 2018-01-04 RX ADMIN — DOCUSATE SODIUM AND SENNOSIDES SCH TAB: 50; 8.6 TABLET ORAL at 08:36

## 2018-01-04 RX ADMIN — POLYETHYLENE GLYCOL 3350 SCH GM: 17 POWDER, FOR SOLUTION ORAL at 08:36

## 2018-01-04 RX ADMIN — POLYETHYLENE GLYCOL 3350 SCH: 17 POWDER, FOR SOLUTION ORAL at 08:38

## 2018-01-04 NOTE — PDIAF
- Diagnosis


Diagnosis: femur fracture


Code Status: Full Code





- Medication Management


Discharge Medications: 


 Medications to Continue on Transfer





Beclomethasone Dipropionate [Qnasl] 2 sprays EACHNARE DAILY PRN 12/31/17 [Last 

Taken Unknown]


Clotrimazole 1% [Lotrimin 1%] 1 roberto carlos TP DAILY PRN 12/31/17 [Last Taken 12/31/17]


Co-Codamol 8mg/500mg 1 - 2 tab PO QID PRN 12/31/17 [Last Taken 12/31/17 10:00 1 

tab]


FLUoxetine [Prozac 20 MG (*)] 20 mg PO DAILY 12/31/17 [Last Taken 12/30/17]


Ramipril [Altace 1.25mg (*)] 1.25 mg PO HS 12/31/17 [Last Taken 12/30/17]


Lidocaine 5% [Lidoderm 5% Patch (*)] 1 ea TD DAILY #10 patch 01/04/18 [Last 

Taken Unknown]


Patch Removal 1 ea TD DAILY21  patch 01/04/18 [Last Taken Unknown]








Discharge Medications: Refer to the Discharge Home Medication list for PRN 

reason.





- Orders


Services needed: Registered Nurse, Physical Therapy, Occupational Therapy


Diet Recommendation: no restrictions on diet


Diet Texture: Regular Texture Diet





- Follow Up Care


Current Providers and Referrals: 


Patient,NotPresent [Unknown] - As per Instructions

## 2018-01-04 NOTE — SOAPPROG
SOAP Progress Note


Assessment/Plan: 


Assessment: POD #3 s/p L hip hemiarthroplasty w/ L shoulder dislocation s/p 

closed reduction in ED


-has been slow to work with PT due to change in mental status yesterday, 

appears alert this am


























Plan:


-PT/OT


  -anterior hip precautions L hip


  -work on PROM L shoulder, pendulums


-WBAT LLE, NWB RUE


-DVT prophy with ASA


-pain ctrl


-appreciate care of hospitalist service


-per case mgmt notes pt may transfer to acute rehab vs snf, however they are 

working on the insurance situation [place]











Subjective: 





Pain well controlled this am. States she is doing "ok" Per RN, she was not able 

to work with PT much yesterday due to drowsiness and inability to follow 

commands well. 


Objective: 





 Vital Signs











Temp Pulse Resp BP Pulse Ox


 


 36.5 C   74   16   160/79 H  97 


 


 01/04/18 07:33  01/04/18 07:33  01/04/18 07:33  01/04/18 07:33  01/04/18 07:33








 Laboratory Results





 01/03/18 04:13 





 01/04/18 04:12 





 











 01/03/18 01/04/18 01/05/18





 05:59 05:59 05:59


 


Intake Total 1860 300 


 


Output Total 125 510 


 


Balance 1735 -210 








 











PT  11.9 SEC (12.0-15.0)  L  12/31/17  19:10    


 


INR  0.86  (0.83-1.16)   12/31/17  19:10    








Gen: NAD, sleeping


  MSK


     LUE


    -in sling


    -moving fingers


   


   LLE


   -dressing c/d/i


   -5/5 GS/TA/EHL/FHL


   _SILT foot


   -foot well perfused


  -leg length and rotation equal to contralateral





- Time Spent With Patient


Time Spent With Patient: 





10 min





ICD10 Worksheet


Patient Problems: 


 Problems











Problem Status Onset


 


Closed left hip fracture Acute  


 


Dislocation of left shoulder joint Acute  


 


Hip fracture Acute

## 2018-01-04 NOTE — ASMTCMCOM
CM Note

 

CM Note                       

Notes:

Initially Lynn ([phone]) left a VM stating she faxed a fit to fly form because 

[employer] wanted to send pt back to [place], they would support with a medical escort and 

upgrade to business class. Then this CM spoke jimmy Pearson at [employer] who states they are trying 


to get auth for 5 day [facility] stay to stabilize pt and get therapy to work with her for a 

flight to [place]. Lili is provided the information for [facility] which is family 

first choice. [employer] will call [facility] [employer] to see if they can work out a financial 

agreement. Jody Tong notified and sent referral again. CM to follow. 

 

Date Signed:  01/04/2018 02:33 PM

Electronically Signed By:BRENTON Chao

## 2018-01-04 NOTE — ASMTCMCOM
CM Note

 

CM Note                       

Notes:

Spoke jimmy Bailey [phone] from [facility] and confirmed w Jody at [facility] they were 


able to make financial agreement. Pt medically stable for d/c to [facility]. Orders sent in 


[employer]. Jody marquez  scheduled a  transport for 1800. Pt dghtr Radha updated. Attempted to 

leave  for Lynn updating her pt will d/c today and she had no voicemail.   

 

Date Signed:  01/04/2018 04:32 PM

Electronically Signed By:BRENTON Chao

## 2018-01-04 NOTE — PDDCSUM
Discharge Summary


Discharge Summary: 


76 YO female from the , visiting, who suffered a left femoral neck fracture. 

Please see H&P for complete details. She had hemiarthroplasty. She became 

acutely confused with narcotic pain meds and these were stopped.


Mentation is back to baseline.


She will be d/c to SNF for 5 days prior to returning to [place].








DDX:





#Left femoral neck fracture


-POD #2 hemiarthroplasty


-pain control: APAP, lidoderm patch. 





#Left pubic rami/sacral fractures: nondisplaced. WBAT





#Left shoulder dislocation: s/p reduction





#Acute encephalopathy/agitation: suspect due to pain meds, which have been 

stopped


-no focal deficits on exam





#Leukocytosis: resolved





#Depression: Prozac





#h/o CAD: cont home meds





Exam:


NAD


[id number]


RRR


CTA B


S/NT/ND


NO LE EDEMA





MEDS: SEE MED REC





F/U: TO SNF FOR PT X 5 DAYS THEN FLY BACK TO THE

## 2018-01-05 NOTE — ASDISCHSUM
----------------------------------------------

Discharge Information

----------------------------------------------

Plan Status:SNF                                      Medically Cleared to Leave:

Discharge Date:01/04/2018 05:27 PM                   CM D/C Disposition:Skilled Nursing Facility

ADT D/C Disposition:Skilled Nursing Facility         Projected Discharge Date:01/04/2018 11:00 AM

Transportation at D/C:Wheelchair Van                 Discharge Delay Reason:

Follow-Up Date:01/04/2018 11:00 AM                   Discharge Slot:

Final Diagnosis:

----------------------------------------------

Placement Information

----------------------------------------------

Referral Type:*Nursing Home/SNF                      Referral ID:[id number]

Provider Name:[facility]//[facility]

Address 1:[address] [place] [address]                              Phone [phone]

Address 2:                                           Fax [phone]

City:[place]                                        Selection Factors:

State:[place]

 

----------------------------------------------

Patient Contact Information

----------------------------------------------

Contact Name:PATTI                           Relationship:Daughter

Address:[address] [place] [address]                         Home [phone]

                                                     Work Phone:

[facility]                                        Alternate Phone:

State/Zip Code:[place] [address]                              Email:

----------------------------------------------

Financial Information

----------------------------------------------

Financial Class:Commercial

Primary Plan Desc:[employer]              Primary Plan Number:[id number]

Secondary Plan Desc:                                 Secondary Plan Number:

 

 

----------------------------------------------

Assessment Information

----------------------------------------------

----------------------------------------------

[facility] CM Progress Note

----------------------------------------------

CM Note

 

CM Note                       

Notes:

Pt had surgery for hip fracture after fall, she also has L shoulder dislocation. Pt is visiting 

from [place], visiting dghtr Radha who lives in [place]. PT/OT rec SNF. Pt does have travel 

insurance, unknown at this time if it will cover a SNF benefit. Family is interested in [facility] 

[place], faxed a referral and they declined because they do not take pt travel insurance. Radha 

and her brother will try to find out about SNF benefit. Tomorrow this CM can call the insurances 

number on face sheet. Radha reports a doctor mentioned pt is not able to fly for another month 

otherwise, family would like to explore SNF in [place]. Pt was originally supposed to return to 

[place] 01/16/18 where she lives with another dghtr. This CM explained to Radha most travel 

insurances do not cover any after care of a hospitalization, mentioned private pay which Radha 

will think about and have to talk to family if insurance will not cover SNF. 

D/c plan of care: Pt needs SNF, a challenge is what travel insurance will cover so until known 

TBD. 

 

Date Signed:  01/02/2018 05:04 PM

Electronically Signed By:BRENTON Chao

 

 

----------------------------------------------

[facility] CM Progress Note

----------------------------------------------

CM Note

 

CM Note                       

Notes:

TC to [facility] ([phone]) spoke jimmy French who needs to review clinicals to determine if 


they can auth SNF. UR staff to send clincials. Gillian to call after review. CM to follow. 

 

Date Signed:  01/03/2018 09:48 AM

Electronically Signed By:BRENTON Chao

 

 

----------------------------------------------

[facility] Progress Note

----------------------------------------------

CM Note

 

CM Note                       

Notes:

Initially Lynn ([phone]) left a VM stating she faxed a fit to fly form because 

[facility] wanted to send pt back to [place], they would support with a medical escort and 

upgrade to business class. Then this CM spoke jimmy Pearson at [facility] who states they are trying 


to get auth for 5 day [place] SN[facility] stay to stabilize pt and get therapy to work with her for a 

flight to [place]. Lili is provided the information for [facility] [place] which is family 

first choice. [employer] will call [facility] to see if they can work out a financial 

agreement. Jody Tong notified and sent referral again. CM to follow. 

 

Date Signed:  01/04/2018 02:33 PM

Electronically Signed By:BRENTON Chao

 

 

----------------------------------------------

[facility] CM Progress Note

----------------------------------------------

CM Note

 

CM Note                       

Notes:

Spoke w Lynn [phone] from [employer] and confirmed w Jody at [facility] they were 


able to make financial agreement. Pt medically stable for d/c to [facility]. Orders sent in 


[employer]. Jody marquez  scheduled a [facility] transport for 1800. Pt dghtr Radha updated. Attempted to 

leave  for Lynn updating her pt will d/c today and she had no voicemail.   

 

Date Signed:  01/04/2018 04:32 PM

Electronically Signed By:BRENTON Chao

 

 

----------------------------------------------

Intervention Information

----------------------------------------------